# Patient Record
Sex: FEMALE | Race: WHITE | Employment: OTHER | URBAN - NONMETROPOLITAN AREA
[De-identification: names, ages, dates, MRNs, and addresses within clinical notes are randomized per-mention and may not be internally consistent; named-entity substitution may affect disease eponyms.]

---

## 2020-09-28 RX ORDER — HYDROCHLOROTHIAZIDE 25 MG/1
25 TABLET ORAL DAILY
COMMUNITY
End: 2021-04-21

## 2020-09-28 RX ORDER — PRAVASTATIN SODIUM 20 MG
20 TABLET ORAL DAILY
COMMUNITY
End: 2021-04-21 | Stop reason: SINTOL

## 2020-09-28 RX ORDER — LOSARTAN POTASSIUM 50 MG/1
50 TABLET ORAL DAILY
COMMUNITY
End: 2021-04-21

## 2020-09-28 SDOH — HEALTH STABILITY: MENTAL HEALTH: HOW OFTEN DO YOU HAVE A DRINK CONTAINING ALCOHOL?: NEVER

## 2020-10-05 ENCOUNTER — OFFICE VISIT (OUTPATIENT)
Dept: VASCULAR SURGERY | Age: 75
End: 2020-10-05
Payer: MEDICARE

## 2020-10-05 VITALS
DIASTOLIC BLOOD PRESSURE: 120 MMHG | RESPIRATION RATE: 18 BRPM | OXYGEN SATURATION: 98 % | SYSTOLIC BLOOD PRESSURE: 220 MMHG | HEART RATE: 85 BPM

## 2020-10-05 PROCEDURE — 1090F PRES/ABSN URINE INCON ASSESS: CPT | Performed by: PHYSICIAN ASSISTANT

## 2020-10-05 PROCEDURE — G8484 FLU IMMUNIZE NO ADMIN: HCPCS | Performed by: PHYSICIAN ASSISTANT

## 2020-10-05 PROCEDURE — G8400 PT W/DXA NO RESULTS DOC: HCPCS | Performed by: PHYSICIAN ASSISTANT

## 2020-10-05 PROCEDURE — G8427 DOCREV CUR MEDS BY ELIG CLIN: HCPCS | Performed by: PHYSICIAN ASSISTANT

## 2020-10-05 PROCEDURE — 99204 OFFICE O/P NEW MOD 45 MIN: CPT | Performed by: PHYSICIAN ASSISTANT

## 2020-10-05 PROCEDURE — 1123F ACP DISCUSS/DSCN MKR DOCD: CPT | Performed by: PHYSICIAN ASSISTANT

## 2020-10-05 PROCEDURE — 1036F TOBACCO NON-USER: CPT | Performed by: PHYSICIAN ASSISTANT

## 2020-10-05 PROCEDURE — 3017F COLORECTAL CA SCREEN DOC REV: CPT | Performed by: PHYSICIAN ASSISTANT

## 2020-10-05 PROCEDURE — 4040F PNEUMOC VAC/ADMIN/RCVD: CPT | Performed by: PHYSICIAN ASSISTANT

## 2020-10-05 PROCEDURE — G8421 BMI NOT CALCULATED: HCPCS | Performed by: PHYSICIAN ASSISTANT

## 2020-10-05 NOTE — PROGRESS NOTES
Patient Care Team:  Edwinna Epley, MD as PCP - General  Eric Chilel DO as Consulting Physician (Vascular Surgery)      History and Physical:    Mrs. Kwadwo Goldsmith is a 69 yo female who has a history of leg swelling, varicose and spider veins bilaterally for many years. She denies any history of SVT/DVT. She denies any claudication. She reports that the swelling seems to be a little worse in the right leg vs the left. She reports that she has  Had problems with leg swelling, heaviness,aching in her legs and multiple leg cramps mostly at  Night. She reports that this has gotten worse over the last month. She works 6 days a week and is on her feet most of the time. She reports that for many years she has been wearing compression stockings 20-30 mmHG and elevating her legs. She also c/o right foot pain but thinks that she broke her foot many years ago. Differential diagnosis for leg pain includes but is not limited to: varicose veins, peripheral vascular disease, arthritic pain, DVT, lumbar disc disease, and neurogenic pain. Kay Cao is a 70 y.o. female with the following history reviewed and recorded in [facility]: There are no active problems to display for this patient. Current Outpatient Medications   Medication Sig Dispense Refill    hydroCHLOROthiazide (HYDRODIURIL) 25 MG tablet Take 25 mg by mouth daily      losartan (COZAAR) 50 MG tablet Take 50 mg by mouth daily      pravastatin (PRAVACHOL) 20 MG tablet Take 20 mg by mouth daily       No current facility-administered medications for this visit. Allergies: Other and Penicillins  Past Medical History:   Diagnosis Date    Essential (primary) hypertension     Hyperlipemia     Varicose veins of legs      History reviewed. No pertinent surgical history.   Family History   Problem Relation Age of Onset    Heart Attack Mother     Heart Attack Father      Social History     Tobacco Use    Smoking status: Never Smoker    Smokeless tobacco: Never Used   Substance Use Topics    Alcohol use: Never     Frequency: Never       Old records obtained from the referring provider. These records have been reviewed and summarized. Review of Systems    Constitutional - no significant activity change, appetite change, or unexpected weight change. No fever or chills. No diaphoresis or significant fatigue. HENT - no significant rhinorrhea or epistaxis. No tinnitus or significant hearing loss. Eyes - no sudden vision change or amaurosis. Respiratory - no significant shortness of breath, wheezing, or stridor. No apnea, cough, or chest tightness associated with shortness of breath. Cardiovascular - no chest pain, syncope, or significant dizziness. No palpitations. She reports leg swelling. No claudication. Gastrointestinal - no abdominal swelling or pain. No blood in stool. No severe constipation, diarrhea, nausea, or vomiting. Genitourinary - No difficulty urinating, dysuria, frequency, or urgency. No flank pain or hematuria. Musculoskeletal - no back pain, gait disturbance, or myalgia. Skin - no color change, rash, pallor, or new wound. Neurologic - no dizziness, facial asymmetry, or light headedness. No seizures. No speech difficulty or lateralizing weakness. Hematologic - no easy bruising or excessive bleeding. Psychiatric - no severe anxiety or nervousness. No confusion. All other review of systems are negative. Physical Exam    BP (!) 220/120 (Site: Left Upper Arm, Position: Sitting, Cuff Size: Medium Adult)   Pulse 85   Resp 18   SpO2 98%     Constitutional - well developed, well nourished. No diaphoresis or acute distress. HENT - head normocephalic. Right external ear canal appears normal.  Left external ear canal appears normal.  Septum appears midline. Eyes - conjunctiva normal.  EOMS normal.  No exudate. No icterus. Neck- ROM appears normal, no tracheal deviation. Cardiovascular - Regular rate and rhythm. Heart sounds are normal.  No murmur, rub, or gallop. Carotid pulses are 2+ to palpation bilaterally without bruit. Extremities - Radial and ulnar pulses are 2+ to palpation bilaterally. DP and PT pulses are palpable. No cyanosis or clubbing. No signs atheroembolic event. She has multiple, large, prominent varicosities/spider veins bilaterally. Right leg appears equal  than the left. Feet are warm and well perfused. No wounds noted. Pulmonary - effort appears normal.  No respiratory distress. Lungs - Breath sounds normal. No wheezes or rales. GI - Abdomen - soft, non tender, bowel sounds X 4 quadrants. No guarding or rebound tenderness. No distension or palpable mass. Genitourinary - deferred. Musculoskeletal - ROM appears normal.  Neurologic - alert and oriented X 3. Physiologic. Skin - warm, dry, and intact. No rash, erythema, or pallor. Psychiatric - mood, affect, and behavior appear normal.  Judgment and thought processes appear normal.      Assessment    1. Leg swelling    2. Right leg pain    3. Left leg pain          Plan      Recommend Nsaids for pain control  Support hose  20-30 mmHg compression daily  Recommend leg elevation above the level of the heart  Good moisturization  Good skin care  Diet and exercise daily  Vein packet given to the patient  We will obtain a BLE venous scan for insufficiency and we will notify her with the results and further recommendations. Addendum:  BLE venous scan for reflux - (revewed by Dr. Alfreda Bazan)  Impression          Right Side: The greater saphenous vein exhibits no reflux. There is no     evidence of deep venous thrombosis in the segments insonated. There is no     deep vein reflux.    Bethany Sarah is no short saphenous vein reflux.          Left Side: The greater saphenous vein exhibits reflux lasting for a     maximum of 6609 milliseconds in the saphenofemoral junction. There is no     evidence of deep venous thrombosis in the segments insonated. Junction                   !          !5               !    +----------------------------------------------+----------+----------------+    ! GSV Mid Thigh                                 !          !3. 7             !    +----------------------------------------------+----------+----------------+    ! GSV Knee                                      !          !4. 1             !    +----------------------------------------------+----------+----------------+    ! SSV High Calf                                 !          !2               !    +----------------------------------------------+----------+----------------+    ! SSV Mid Calf                                  !          !2. 5             !    +----------------------------------------------+----------+----------------+    ! SSV Ankle                                     !          !2. 4             !    +----------------------------------------------+----------+----------------+         Left Mapping    +----------------------------------------------+----------+----------------+    ! Location                                      !AP Diam   ! Trans Diam      !    +----------------------------------------------+----------+----------------+    ! Sapheno Femoral Junction                      !6.3       !                !    +----------------------------------------------+----------+----------------+    ! GSV 2 cm Distal to Junction                   !          !5. 2             !    +----------------------------------------------+----------+----------------+    ! GSV Mid Thigh                                 !          !3. 7             !    +----------------------------------------------+----------+----------------+    ! GSV Knee                                      !          !4. 7             !    +----------------------------------------------+----------+----------------+    ! SSV High Calf                                 !          !6. 8             ! +----------------------------------------------+----------+----------------+    ! SSV Mid Calf                                  !          !2.2             !    +----------------------------------------------+----------+----------------+    ! SSV Ankle                                     !          !2. 1             !    +----------------------------------------------+----------+----------------+         Velocities are measured in cm/s ; Diameters are measured in mm         Right Lower Extremities DVT Study Measurements    Right 2D Measurements    +------------------------------------+----------+---------------+----------+    ! Location                            ! Visualized! Compressibility! Thrombosis! +------------------------------------+----------+---------------+----------+    ! Sapheno Femoral Junction            ! Yes       ! Yes            !None      !    +------------------------------------+----------+---------------+----------+    ! Common Femoral                      ! Yes       ! Yes            !None      !    +------------------------------------+----------+---------------+----------+    ! Prox Femoral                        ! Yes       ! Yes            !None      !    +------------------------------------+----------+---------------+----------+    ! Mid Femoral                         ! Yes       ! Yes            !None      !    +------------------------------------+----------+---------------+----------+    ! Dist Femoral                        ! Yes       ! Yes            !None      !    +------------------------------------+----------+---------------+----------+    ! Deep Femoral                        ! Yes       ! Yes            !None      !    +------------------------------------+----------+---------------+----------+    ! Popliteal                           ! Yes       ! Yes            !None      !    +------------------------------------+----------+---------------+----------+    ! DORISV                                 !Yes       ! Yes            !None      !    +------------------------------------+----------+---------------+----------+    ! Gastroc                             ! Yes       ! Yes            !None      !    +------------------------------------+----------+---------------+----------+    ! PTV                                 ! Yes       ! Yes            !None      !    +------------------------------------+----------+---------------+----------+    ! GSV                                 ! Yes       ! Yes            !None      !    +------------------------------------+----------+---------------+----------+    ! ATV                                 ! Yes       ! Yes            !None      !    +------------------------------------+----------+---------------+----------+    ! Peroneal                            ! Yes       ! Yes            !None      !    +------------------------------------+----------+---------------+----------+         Left Lower Extremities DVT Study Measurements    Left 2D Measurements    +------------------------------------+----------+---------------+----------+    ! Location                            ! Visualized! Compressibility! Thrombosis! +------------------------------------+----------+---------------+----------+    ! Sapheno Femoral Junction            ! Yes       ! Yes            !None      !    +------------------------------------+----------+---------------+----------+    ! Common Femoral                      ! Yes       ! Yes            !None      !    +------------------------------------+----------+---------------+----------+    ! Prox Femoral                        ! Yes       ! Yes            !None      !    +------------------------------------+----------+---------------+----------+    ! Mid Femoral                         ! Yes       ! Yes            !None      !    +------------------------------------+----------+---------------+----------+    ! Dist Femoral                        ! Yes       ! Yes            !None      !    +------------------------------------+----------+---------------+----------+    ! Deep Femoral                        ! Yes       ! Yes            !None      !    +------------------------------------+----------+---------------+----------+    ! Popliteal                           ! Yes       ! Yes            !None      !    +------------------------------------+----------+---------------+----------+    ! SSV                                 ! Yes       ! Yes            !None      !    +------------------------------------+----------+---------------+----------+    ! Gastroc                             ! Yes       ! Yes            !None      !    +------------------------------------+----------+---------------+----------+    ! PTV                                 ! Yes       ! Yes            !None      !    +------------------------------------+----------+---------------+----------+    ! GSV                                 ! Yes       ! Yes            !None      !    +------------------------------------+----------+---------------+----------+    ! ATV                                 ! Yes       ! Yes            !None      !    +------------------------------------+----------+---------------+----------+    ! Peroneal                            ! Yes       ! Yes            !None      !    +------------------------------------+----------+---------------+----------+             Dr. Yvette Potts has reviewed the reflux study. If she fails compression therapy and elevation, she would be a candidate for a RFA left GSV only.

## 2020-10-22 ENCOUNTER — HOSPITAL ENCOUNTER (OUTPATIENT)
Dept: VASCULAR LAB | Age: 75
Discharge: HOME | End: 2020-10-22
Payer: MEDICARE

## 2020-10-22 PROCEDURE — 93970 EXTREMITY STUDY: CPT

## 2020-11-06 ENCOUNTER — TELEPHONE (OUTPATIENT)
Dept: VASCULAR SURGERY | Age: 75
End: 2020-11-06

## 2020-11-06 NOTE — TELEPHONE ENCOUNTER
----- Message from Sofya Souza PA-C sent at 11/6/2020 12:45 PM CST -----  See plan in OV note. Can you call her with his recommendations. She also needs a 3 month f/u appt.

## 2020-11-06 NOTE — TELEPHONE ENCOUNTER
I sw the pt to let her know after reviewing her case Dr. Blayne Covarrubias states she will be a candidate for only a L GSV RFA if she fails compression and elevation therapy. I informed the pt I will schedule her for a 3 month telehealth f/u. Pt was agreeable to this plan. Pt voiced understanding and is aware of the POC.

## 2021-02-18 ENCOUNTER — VIRTUAL VISIT (OUTPATIENT)
Dept: VASCULAR SURGERY | Age: 76
End: 2021-02-18
Payer: MEDICARE

## 2021-02-18 DIAGNOSIS — M79.89 LEG SWELLING: ICD-10-CM

## 2021-02-18 DIAGNOSIS — I87.2 VENOUS INSUFFICIENCY: Primary | ICD-10-CM

## 2021-02-18 PROCEDURE — 3017F COLORECTAL CA SCREEN DOC REV: CPT | Performed by: PHYSICIAN ASSISTANT

## 2021-02-18 PROCEDURE — G8421 BMI NOT CALCULATED: HCPCS | Performed by: PHYSICIAN ASSISTANT

## 2021-02-18 PROCEDURE — 1036F TOBACCO NON-USER: CPT | Performed by: PHYSICIAN ASSISTANT

## 2021-02-18 PROCEDURE — 99213 OFFICE O/P EST LOW 20 MIN: CPT | Performed by: PHYSICIAN ASSISTANT

## 2021-02-18 PROCEDURE — G8484 FLU IMMUNIZE NO ADMIN: HCPCS | Performed by: PHYSICIAN ASSISTANT

## 2021-02-18 PROCEDURE — G8400 PT W/DXA NO RESULTS DOC: HCPCS | Performed by: PHYSICIAN ASSISTANT

## 2021-02-18 PROCEDURE — 1090F PRES/ABSN URINE INCON ASSESS: CPT | Performed by: PHYSICIAN ASSISTANT

## 2021-02-18 PROCEDURE — 4040F PNEUMOC VAC/ADMIN/RCVD: CPT | Performed by: PHYSICIAN ASSISTANT

## 2021-02-18 PROCEDURE — G8428 CUR MEDS NOT DOCUMENT: HCPCS | Performed by: PHYSICIAN ASSISTANT

## 2021-02-18 PROCEDURE — 1123F ACP DISCUSS/DSCN MKR DOCD: CPT | Performed by: PHYSICIAN ASSISTANT

## 2021-02-18 NOTE — PROGRESS NOTES
Patient Care Team:  Callie Bhatti MD as PCP - General  Rosemary Eid DO as Consulting Physician (Vascular Surgery)      History and Physical:    Mrs. Laura Godinez is a 71 yo female who has a history of leg swelling, varicose and spider veins bilaterally for many years. She denies any history of SVT/DVT. She denies any claudication. She reports that the swelling seems to be a little worse in the right leg vs the left. She reports that she has had problems with leg swelling, heaviness,aching in her legs and multiple leg cramps mostly at night. She reports that this has gotten worse over the last month. She works 6 days a week and is on her feet most of the time. She reports that for many years she has been wearing compression stockings 20-30 mmHG and elevating her legs. She also c/o right foot pain but thinks that she broke her foot many years ago. Today she reports that her legs are still swelling and are painful. This is affecting her daily living. She reports that she sleeps in a lift chair due to her inability to get comfortable in the bed. Isreal Cruz is a 70 y.o. female with the following history reviewed and recorded in [employer]: There are no active problems to display for this patient. Current Outpatient Medications   Medication Sig Dispense Refill    hydroCHLOROthiazide (HYDRODIURIL) 25 MG tablet Take 25 mg by mouth daily      losartan (COZAAR) 50 MG tablet Take 50 mg by mouth daily      pravastatin (PRAVACHOL) 20 MG tablet Take 20 mg by mouth daily       No current facility-administered medications for this visit. Allergies: Other and Penicillins  Past Medical History:   Diagnosis Date    Essential (primary) hypertension     Hyperlipemia     Varicose veins of legs      No past surgical history on file.   Family History   Problem Relation Age of Onset    Heart Attack Mother     Heart Attack Father      Social History     Tobacco Use    Smoking status: Never Smoker  Smokeless tobacco: Never Used   Substance Use Topics    Alcohol use: Never     Frequency: Never       Review of Systems    Constitutional  no significant activity change, appetite change, or unexpected weight change. No fever or chills. No diaphoresis or significant fatigue. HENT  no significant rhinorrhea or epistaxis. No tinnitus or significant hearing loss. Eyes  no sudden vision change or amaurosis. Respiratory  no significant shortness of breath, wheezing, or stridor. No apnea, cough, or chest tightness associated with shortness of breath. Cardiovascular  no chest pain, syncope, or significant dizziness. No palpitations. She reports leg swelling. No claudication. (see HPI)  Gastrointestinal  no abdominal swelling or pain. No blood in stool. No severe constipation, diarrhea, nausea, or vomiting. Genitourinary  No difficulty urinating, dysuria, frequency, or urgency. No flank pain or hematuria. Musculoskeletal  no back pain, gait disturbance, or myalgia. Skin  no color change, rash, pallor, or new wound. Neurologic  no dizziness, facial asymmetry, or light headedness. No seizures. No speech difficulty or lateralizing weakness. Hematologic  no easy bruising or excessive bleeding. Psychiatric  no severe anxiety or nervousness. No confusion. All other review of systems are negative. Physical Exam      Constitutional  well developed, well nourished. No diaphoresis or acute distress. HENT  head normocephalic. Right external ear canal appears normal.  Left external ear canal appears normal.  Septum appears midline. Eyes  conjunctiva normal.  EOMS normal.  No exudate. No icterus. Neck- ROM appears normal, no tracheal deviation. Cardiovascular  Regular rate and rhythm. Heart sounds are normal.  No murmur, rub, or gallop. Carotid pulses are 2+ to palpation bilaterally without bruit. Extremities - Radial and ulnar pulses are 2+ to palpation bilaterally. DP and PT pulses are palpable. No cyanosis or clubbing. No signs atheroembolic event. She has multiple, large, prominent varicosities/spider veins bilaterally. Right leg appears equal  than the left. Feet are warm and well perfused. No wounds noted. Pulmonary  effort appears normal.  No respiratory distress. Lungs - Breath sounds normal. No wheezes or rales. GI - Abdomen  soft, non tender, bowel sounds X 4 quadrants. No guarding or rebound tenderness. No distension or palpable mass. Genitourinary  deferred. Musculoskeletal  ROM appears normal.  Neurologic  alert and oriented X 3. Physiologic. Skin  warm, dry, and intact. No rash, erythema, or pallor. Psychiatric  mood, affect, and behavior appear normal.  Judgment and thought processes appear normal.      Assessment    1. CVI eft leg  2. BLE swelling  3. BLE with varicose veins with leg pain      Plan      Recommend Nsaids for pain control  Support hose  20-30 mmHg compression daily  Recommend leg elevation above the level of the heart  Good moisturization  Good skin care  Diet and exercise daily    Addendum:  BLE venous scan for reflux - (revewed by Dr. León Perez)  Impression          Right Side: The greater saphenous vein exhibits no reflux. There is no     evidence of deep venous thrombosis in the segments insonated. There is no     deep vein reflux.    Isaias Godinez is no short saphenous vein reflux.          Left Side: The greater saphenous vein exhibits reflux lasting for a     maximum of 6609 milliseconds in the saphenofemoral junction. There is no     evidence of deep venous thrombosis in the segments insonated.  There is no     deep vein reflux.    Isaias Godinez is 6609milli seconds of short saphenous vein reflux.          Signature          ----------------------------------------------------------------     Electronically signed by Marcia Quiroga MD(Interpreting  physician) on 10/23/2020 07:00 AM     ----------------------------------------------------------------         Velocities are measured in cm/s ; Diameters are measured in mm         Left Doppler Measurements    +----------------------------------------+------+------+-------------------+    ! Location                                !Signal!Reflux! Reflux (msec)      !    +----------------------------------------+------+------+-------------------+    ! Sapheno Femoral Junction                !      !      !9312               !    +----------------------------------------+------+------+-------------------+    ! GSV 2 cm Distal to Junction             !      !      !7433               !    +----------------------------------------+------+------+-------------------+    ! GSV Mid Thigh                           !      !      !1098               !    +----------------------------------------+------+------+-------------------+    ! GSV Knee                                !      !      !8011               !    +----------------------------------------+------+------+-------------------+    ! SSV High Calf                           !      !      !1263               !    +----------------------------------------+------+------+-------------------+         Right Mapping    +----------------------------------------------+----------+----------------+    ! Location                                      !AP Diam   ! Trans Diam      !    +----------------------------------------------+----------+----------------+    ! Sapheno Femoral Junction                      !7.7       !                !    +----------------------------------------------+----------+----------------+    ! GSV 2 cm Distal to Junction                   !          !5               !    +----------------------------------------------+----------+----------------+    ! GSV Mid Thigh                                 !          !3. 7             ! +----------------------------------------------+----------+----------------+    ! GSV Knee                                      !          !4. 1             !    +----------------------------------------------+----------+----------------+    ! SSV High Calf                                 !          !2               !    +----------------------------------------------+----------+----------------+    ! SSV Mid Calf                                  !          !2. 5             !    +----------------------------------------------+----------+----------------+    ! SSV Ankle                                     !          !2. 4             !    +----------------------------------------------+----------+----------------+         Left Mapping    +----------------------------------------------+----------+----------------+    ! Location                                      !AP Diam   ! Trans Diam      !    +----------------------------------------------+----------+----------------+    ! Sapheno Femoral Junction                      !6.3       !                !    +----------------------------------------------+----------+----------------+    ! GSV 2 cm Distal to Junction                   !          !5. 2             !    +----------------------------------------------+----------+----------------+    ! GSV Mid Thigh                                 !          !3. 7             !    +----------------------------------------------+----------+----------------+    ! GSV Knee                                      !          !4. 7             !    +----------------------------------------------+----------+----------------+    ! SSV High Calf                                 !          !6. 8             !    +----------------------------------------------+----------+----------------+    ! SSV Mid Calf                                  !          !2.2             !    +----------------------------------------------+----------+----------------+ ! SSV Ankle                                     !          !2. 1             !    +----------------------------------------------+----------+----------------+         Velocities are measured in cm/s ; Diameters are measured in mm         Right Lower Extremities DVT Study Measurements    Right 2D Measurements    +------------------------------------+----------+---------------+----------+    ! Location                            ! Visualized! Compressibility! Thrombosis! +------------------------------------+----------+---------------+----------+    ! Sapheno Femoral Junction            ! Yes       ! Yes            !None      !    +------------------------------------+----------+---------------+----------+    ! Common Femoral                      ! Yes       ! Yes            !None      !    +------------------------------------+----------+---------------+----------+    ! Prox Femoral                        ! Yes       ! Yes            !None      !    +------------------------------------+----------+---------------+----------+    ! Mid Femoral                         ! Yes       ! Yes            !None      !    +------------------------------------+----------+---------------+----------+    ! Dist Femoral                        ! Yes       ! Yes            !None      !    +------------------------------------+----------+---------------+----------+    ! Deep Femoral                        ! Yes       ! Yes            !None      !    +------------------------------------+----------+---------------+----------+    ! Popliteal                           ! Yes       ! Yes            !None      !    +------------------------------------+----------+---------------+----------+    ! SSV                                 ! Yes       ! Yes            !None      !    +------------------------------------+----------+---------------+----------+    ! Gastroc                             ! Yes       ! Yes            !None      ! +------------------------------------+----------+---------------+----------+    ! Popliteal                           ! Yes       ! Yes            !None      !    +------------------------------------+----------+---------------+----------+    ! SSV                                 ! Yes       ! Yes            !None      !    +------------------------------------+----------+---------------+----------+    ! Gastroc                             ! Yes       ! Yes            !None      !    +------------------------------------+----------+---------------+----------+    ! PTV                                 ! Yes       ! Yes            !None      !    +------------------------------------+----------+---------------+----------+    ! GSV                                 ! Yes       ! Yes            !None      !    +------------------------------------+----------+---------------+----------+    ! ATV                                 ! Yes       ! Yes            !None      !    +------------------------------------+----------+---------------+----------+    ! Peroneal                            ! Yes       ! Yes            !None      !    +------------------------------------+----------+---------------+----------+             Dr. Ricki Love has reviewed the reflux study. If she fails compression therapy and elevation, she would be a candidate for a RFA left GSV only. Proceed with RFA left GSV. I will d/w Dr. Ricki Love need for possible Venogram in the future. We will call her with further recommendations.      Addendum: Dr. Toy Claude recommends we proceed with RFA left GSV first and at a later date she will need a Right iliofemoral venogram with IVUS possible iliac stent. Options were discussed with the patient including continued medical management versus proceeding with RFA left GSV followed by a Venogram and potential intervention for Right iliofemoral venogram with IVUS possible iliac stent. .  Patient has opted to proceed with this Risks have been discussed with the patient including but not limited to MI, death, CVA, bleed, nerve injury, infection, contrast nephropathy, possible need for dialysis, and need for further surgery.       Proceed with RFA left GSV

## 2021-04-17 ENCOUNTER — OFFICE VISIT (OUTPATIENT)
Age: 76
End: 2021-04-17

## 2021-04-17 DIAGNOSIS — Z11.59 SCREENING FOR VIRAL DISEASE: Primary | ICD-10-CM

## 2021-04-17 LAB — SARS-COV-2, PCR: NORMAL

## 2021-04-17 PROCEDURE — 99999 PR OFFICE/OUTPT VISIT,PROCEDURE ONLY: CPT | Performed by: NURSE PRACTITIONER

## 2021-04-19 DIAGNOSIS — M79.605 LEFT LEG PAIN: Primary | ICD-10-CM

## 2021-04-19 DIAGNOSIS — I87.2 VENOUS INSUFFICIENCY: Primary | ICD-10-CM

## 2021-04-19 DIAGNOSIS — M79.89 LEFT LEG SWELLING: ICD-10-CM

## 2021-04-19 DIAGNOSIS — I87.2 VENOUS INSUFFICIENCY: ICD-10-CM

## 2021-04-20 RX ORDER — DIAZEPAM 10 MG/1
TABLET ORAL
Qty: 1 TABLET | Refills: 0 | Status: SHIPPED | OUTPATIENT
Start: 2021-04-20 | End: 2021-04-22

## 2021-04-21 ENCOUNTER — PROCEDURE VISIT (OUTPATIENT)
Dept: VASCULAR SURGERY | Age: 76
End: 2021-04-21

## 2021-04-21 VITALS
SYSTOLIC BLOOD PRESSURE: 234 MMHG | RESPIRATION RATE: 18 BRPM | OXYGEN SATURATION: 99 % | HEIGHT: 64 IN | HEART RATE: 74 BPM | DIASTOLIC BLOOD PRESSURE: 102 MMHG | TEMPERATURE: 96.5 F | WEIGHT: 165 LBS | BODY MASS INDEX: 28.17 KG/M2

## 2021-04-21 DIAGNOSIS — I87.2 VENOUS INSUFFICIENCY: ICD-10-CM

## 2021-04-21 DIAGNOSIS — M79.605 LEFT LEG PAIN: Primary | ICD-10-CM

## 2021-04-21 DIAGNOSIS — M79.89 LEFT LEG SWELLING: ICD-10-CM

## 2021-04-21 RX ORDER — LISINOPRIL 10 MG/1
10 TABLET ORAL PRN
COMMUNITY

## 2021-04-21 NOTE — PROGRESS NOTES
performed. The sheath and catheter were removed and both were inspected and intact. Pressure was applied at exit site for 10 minutes. Sterile dressings and ace wrap were applied. The patient was released in stable condition and discharge instructions were given to the patient. They will follow-up in 72 hours for left lower extremity venous duplex and office visit.             [facility], DO

## 2021-04-23 ENCOUNTER — HOSPITAL ENCOUNTER (OUTPATIENT)
Dept: VASCULAR LAB | Age: 76
Discharge: HOME | End: 2021-04-23
Payer: MEDICARE

## 2021-04-23 DIAGNOSIS — M79.605 LEFT LEG PAIN: ICD-10-CM

## 2021-04-23 DIAGNOSIS — M79.89 LEFT LEG SWELLING: ICD-10-CM

## 2021-04-23 DIAGNOSIS — I87.2 VENOUS INSUFFICIENCY: ICD-10-CM

## 2021-04-23 PROCEDURE — 93971 EXTREMITY STUDY: CPT

## 2021-04-26 ENCOUNTER — TELEPHONE (OUTPATIENT)
Dept: VASCULAR SURGERY | Age: 76
End: 2021-04-26

## 2021-04-26 NOTE — TELEPHONE ENCOUNTER
----- Message from Antonio Becker PA-C sent at 4/24/2021  5:59 PM CDT -----  Please call and let her know that her ablation was successful and he has no DVT

## 2021-04-27 ENCOUNTER — VIRTUAL VISIT (OUTPATIENT)
Dept: VASCULAR SURGERY | Age: 76
End: 2021-04-27
Payer: MEDICARE

## 2021-04-27 ENCOUNTER — TELEPHONE (OUTPATIENT)
Dept: VASCULAR SURGERY | Age: 76
End: 2021-04-27

## 2021-04-27 DIAGNOSIS — M79.604 LEG PAIN, RIGHT: ICD-10-CM

## 2021-04-27 DIAGNOSIS — I87.2 VENOUS INSUFFICIENCY: Primary | ICD-10-CM

## 2021-04-27 DIAGNOSIS — M79.89 LEG SWELLING: ICD-10-CM

## 2021-04-27 PROCEDURE — 4040F PNEUMOC VAC/ADMIN/RCVD: CPT | Performed by: NURSE PRACTITIONER

## 2021-04-27 PROCEDURE — G8417 CALC BMI ABV UP PARAM F/U: HCPCS | Performed by: NURSE PRACTITIONER

## 2021-04-27 PROCEDURE — 3017F COLORECTAL CA SCREEN DOC REV: CPT | Performed by: NURSE PRACTITIONER

## 2021-04-27 PROCEDURE — 1123F ACP DISCUSS/DSCN MKR DOCD: CPT | Performed by: NURSE PRACTITIONER

## 2021-04-27 PROCEDURE — 1090F PRES/ABSN URINE INCON ASSESS: CPT | Performed by: NURSE PRACTITIONER

## 2021-04-27 PROCEDURE — 99204 OFFICE O/P NEW MOD 45 MIN: CPT | Performed by: NURSE PRACTITIONER

## 2021-04-27 PROCEDURE — G8427 DOCREV CUR MEDS BY ELIG CLIN: HCPCS | Performed by: NURSE PRACTITIONER

## 2021-04-27 PROCEDURE — G8400 PT W/DXA NO RESULTS DOC: HCPCS | Performed by: NURSE PRACTITIONER

## 2021-04-27 PROCEDURE — 1036F TOBACCO NON-USER: CPT | Performed by: NURSE PRACTITIONER

## 2021-04-27 RX ORDER — SODIUM CHLORIDE 9 MG/ML
25 INJECTION, SOLUTION INTRAVENOUS PRN
Status: CANCELLED | OUTPATIENT
Start: 2021-04-27

## 2021-04-27 RX ORDER — SODIUM CHLORIDE 9 MG/ML
INJECTION, SOLUTION INTRAVENOUS CONTINUOUS
Status: CANCELLED | OUTPATIENT
Start: 2021-04-27

## 2021-04-27 RX ORDER — ASPIRIN 81 MG/1
81 TABLET ORAL ONCE
Status: CANCELLED | OUTPATIENT
Start: 2021-04-27 | End: 2021-04-27

## 2021-04-27 RX ORDER — SODIUM CHLORIDE 0.9 % (FLUSH) 0.9 %
10 SYRINGE (ML) INJECTION PRN
Status: CANCELLED | OUTPATIENT
Start: 2021-04-27

## 2021-04-27 RX ORDER — CLONIDINE HYDROCHLORIDE 0.1 MG/1
0.1 TABLET ORAL PRN
Status: CANCELLED | OUTPATIENT
Start: 2021-04-27

## 2021-04-27 NOTE — H&P (VIEW-ONLY)
Lilly Osorio is a 70 y.o. female evaluated via telephone on 2021. Lilly Osorio (:  1945) is a 70 y.o. female,Established patient, here for evaluation of the following chief complaint(s):     Consent:  She and/or health care decision maker is aware that that she may receive a bill for this telephone service, depending on her insurance coverage, and has provided verbal consent to proceed: Yes    Patient is located at home  Provider is located at [facility]   Also present during call is no one    Patient had left GSV ablation last week. She is doing well. She is wearing her support hose. No fever or chills. She has continued right leg swelling. She reports it does not go down. She has deep pain in the right leg. Lilly Osorio is a 70 y.o. female with the following history reviewed and recorded in [facility]:  Patient Active Problem List    Diagnosis Date Noted    Venous insufficiency 2021     Current Outpatient Medications   Medication Sig Dispense Refill    lisinopril (PRINIVIL;ZESTRIL) 10 MG tablet Take 10 mg by mouth 2 times daily       No current facility-administered medications for this visit. Allergies: Other and Penicillins  Past Medical History:   Diagnosis Date    Essential (primary) hypertension     Hyperlipemia     Varicose veins of legs      Past Surgical History:   Procedure Laterality Date    VASCULAR SURGERY  2021    L GSV/RFA     Family History   Problem Relation Age of Onset    Heart Attack Mother     Heart Attack Father      Social History     Tobacco Use    Smoking status: Never Smoker    Smokeless tobacco: Never Used   Substance Use Topics    Alcohol use: Never     Frequency: Never       No flowsheet data found. Review of Systems      Eyes  no sudden vision change or amaurosis. Respiratory  no significant shortness of breath, wheezing, or stridor.   No cough,  Cardiovascular  no chest pain, syncope, or significant dizziness. No significant leg swelling.  has not had claudication. Skin   has not had new wound. Neurologic   No speech difficulty or lateralizing weakness. Psychiatric  no severe anxiety or nervousness. No confusion. All other review of systems are negative. PHYSICAL EXAMINATION:    [ INSTRUCTIONS:  \"[x]\" Indicates a positive item  \"[]\" Indicates a negative item  -- DELETE ALL ITEMS NOT EXAMINED]    [x] Alert  [x] Oriented to person/place/time    [x] No apparent distress  [] Toxic appearing  [x] Normal Mood  [] Anxious appearing    [] Depressed appearing  [] Confused appearing      [] Poor short term memory  [] Poor long term memory   Memory appears to be intact       Venous scan -   There is no evidence of deep venous thrombosis (DVT) in the left lower    extremity(ies).    Successful ablation of the greater saphenous vein in the left lower    extremity. Individual images were reviewed. Results were reviewed with the patient. Options were discussed with the patient including continued medical management versus proceeding with venography and potential intervention for possible right iliac vein stenosis. Patient has opted to proceed with venography. Risks have been discussed with the patient including but not limited to MI, death, CVA, bleed, nerve injury, infection, contrast nephropathy, possible need for dialysis, and need for further surgery. Assessment    1. Venous insufficiency    2. Leg pain, right    3. Leg swelling          Plan    1. Venous insufficiency  2. Leg pain, right  3. Leg swelling      Proceed with venogram right leg with possible right iliac vein angioplasty/stent with IVUS due to continued leg pain and swelling  Continue support hose  Strongly encouraged start/continue statin therapy  Recommended no smoking        Documentation:  I communicated with the patient and/or health care decision maker about cvi.    Details of this discussion including any medical advice provided: as needed       I affirm this is a Patient Initiated Episode with a Patient who has not had a related appointment within my department in the past 7 days or scheduled within the next 24 hours. Patient identification was verified at the start of the visit: Yes    Total Time:11-20 minutes  The visit was conducted pursuant to the emergency declaration under the [address] [place] [address], [address] [place] [address] and the [employer] and 800APP General Act. Patient identification was verified, and a caregiver was present when appropriate. The patient was located in a state where the provider was credentialed to provide care.     Note: not billable if this call serves to triage the patient into an appointment for the relevant concern      [facility]

## 2021-04-27 NOTE — H&P
Ada Patel is a 70 y.o. female evaluated via telephone on 2021. Ada Patel (:  1945) is a 70 y.o. female,Established patient, here for evaluation of the following chief complaint(s):     Consent:  She and/or health care decision maker is aware that that she may receive a bill for this telephone service, depending on her insurance coverage, and has provided verbal consent to proceed: Yes    Patient is located at home  Provider is located at [facility]   Also present during call is no one    Patient had left GSV ablation last week. She is doing well. She is wearing her support hose. No fever or chills. She has continued right leg swelling. She reports it does not go down. She has deep pain in the right leg. Ada Patel is a 70 y.o. female with the following history reviewed and recorded in [employer]:  Patient Active Problem List    Diagnosis Date Noted    Venous insufficiency 2021     Current Outpatient Medications   Medication Sig Dispense Refill    lisinopril (PRINIVIL;ZESTRIL) 10 MG tablet Take 10 mg by mouth 2 times daily       No current facility-administered medications for this visit. Allergies: Other and Penicillins  Past Medical History:   Diagnosis Date    Essential (primary) hypertension     Hyperlipemia     Varicose veins of legs      Past Surgical History:   Procedure Laterality Date    VASCULAR SURGERY  2021    L GSV/RFA     Family History   Problem Relation Age of Onset    Heart Attack Mother     Heart Attack Father      Social History     Tobacco Use    Smoking status: Never Smoker    Smokeless tobacco: Never Used   Substance Use Topics    Alcohol use: Never     Frequency: Never       No flowsheet data found. Review of Systems      Eyes  no sudden vision change or amaurosis. Respiratory  no significant shortness of breath, wheezing, or stridor.   No cough,  Cardiovascular  no chest pain, syncope, or significant

## 2021-04-27 NOTE — PROGRESS NOTES
Saint Sago is a 70 y.o. female evaluated via telephone on 2021. Saint Sago (:  1945) is a 70 y.o. female,Established patient, here for evaluation of the following chief complaint(s):     Consent:  She and/or health care decision maker is aware that that she may receive a bill for this telephone service, depending on her insurance coverage, and has provided verbal consent to proceed: Yes    Patient is located at home  Provider is located at [facility]   Also present during call is no one    Patient had left GSV ablation last week. She is doing well. She is wearing her support hose. No fever or chills. She has continued right leg swelling. She reports it does not go down. She has deep pain in the right leg. Saint Sago is a 70 y.o. female with the following history reviewed and recorded in [employer]:  Patient Active Problem List    Diagnosis Date Noted    Venous insufficiency 2021     Current Outpatient Medications   Medication Sig Dispense Refill    lisinopril (PRINIVIL;ZESTRIL) 10 MG tablet Take 10 mg by mouth 2 times daily       No current facility-administered medications for this visit. Allergies: Other and Penicillins  Past Medical History:   Diagnosis Date    Essential (primary) hypertension     Hyperlipemia     Varicose veins of legs      Past Surgical History:   Procedure Laterality Date    VASCULAR SURGERY  2021    L GSV/RFA     Family History   Problem Relation Age of Onset    Heart Attack Mother     Heart Attack Father      Social History     Tobacco Use    Smoking status: Never Smoker    Smokeless tobacco: Never Used   Substance Use Topics    Alcohol use: Never     Frequency: Never       No flowsheet data found. Review of Systems      Eyes  no sudden vision change or amaurosis. Respiratory  no significant shortness of breath, wheezing, or stridor.   No cough,  Cardiovascular  no chest pain, syncope, or significant dizziness. No significant leg swelling.  has not had claudication. Skin   has not had new wound. Neurologic   No speech difficulty or lateralizing weakness. Psychiatric  no severe anxiety or nervousness. No confusion. All other review of systems are negative. PHYSICAL EXAMINATION:    [ INSTRUCTIONS:  \"[x]\" Indicates a positive item  \"[]\" Indicates a negative item  -- DELETE ALL ITEMS NOT EXAMINED]    [x] Alert  [x] Oriented to person/place/time    [x] No apparent distress  [] Toxic appearing  [x] Normal Mood  [] Anxious appearing    [] Depressed appearing  [] Confused appearing      [] Poor short term memory  [] Poor long term memory   Memory appears to be intact       Venous scan -   There is no evidence of deep venous thrombosis (DVT) in the left lower    extremity(ies).    Successful ablation of the greater saphenous vein in the left lower    extremity. Individual images were reviewed. Results were reviewed with the patient. Options were discussed with the patient including continued medical management versus proceeding with venography and potential intervention for possible right iliac vein stenosis. Patient has opted to proceed with venography. Risks have been discussed with the patient including but not limited to MI, death, CVA, bleed, nerve injury, infection, contrast nephropathy, possible need for dialysis, and need for further surgery. Assessment    1. Venous insufficiency    2. Leg pain, right    3. Leg swelling          Plan    1. Venous insufficiency  2. Leg pain, right  3. Leg swelling      Proceed with venogram right leg with possible right iliac vein angioplasty/stent with IVUS due to continued leg pain and swelling  Continue support hose  Strongly encouraged start/continue statin therapy  Recommended no smoking        Documentation:  I communicated with the patient and/or health care decision maker about cvi.    Details of this discussion including any medical advice provided: as needed       I affirm this is a Patient Initiated Episode with a Patient who has not had a related appointment within my department in the past 7 days or scheduled within the next 24 hours. Patient identification was verified at the start of the visit: Yes    Total Time:11-20 minutes  The visit was conducted pursuant to the emergency declaration under the [address] [place] [address], [address] [place] [address] and the [employer] and [employer] Act. Patient identification was verified, and a caregiver was present when appropriate. The patient was located in a state where the provider was credentialed to provide care.     Note: not billable if this call serves to triage the patient into an appointment for the relevant concern      Todd Galeano

## 2021-04-27 NOTE — TELEPHONE ENCOUNTER
I tried contacting the pt to discuss the details of her upcoming procedure with Dr. Shell Shahid. Unfortunately I was unable to reach her or leave a vm. I will try again at a later time.

## 2021-04-28 NOTE — TELEPHONE ENCOUNTER
I sw the pt to discuss the details of her upcoming procedure with Dr. Deann Sanon on 2021. I will also mail a letter out today with this information. Directions discussed as follows:          Gordon Ramirez    Venogram Instructions    1. Report to the [facility] at [facility] (go in the front door and to the left) on 2021, at 7:00 am.  2. Nothing to eat or drink after midnight the night before the procedure. 3. Please take all medications as normally scheduled to take, including heart and blood pressure medicines with a sip of water. Except Lisinopril, do not take this medication the morning of surgery. 4. Do not take Lasix, insulin, or any diabetic medicine the morning of the procedure. If you take insulin, you may only take 1/2 of any scheduled nightly dose, and none the morning of the procedure. You may take all regularly scheduled heart, cholesterol, and blood pressure medicines with a sip of water. 5. If you take Glucophage, Metformin, Actos Plus Met, or Glucovance,you can not take this the day of your procedure and two days after the procedure. 6. Hold Plavix/Coumadin for 0 days prior to surgery. 7. Do not hold aspirin. 8. If you have sleep apnea and require C-PAP, please bring this with you to the hospital.  9. Bring a list of all of your allergies and medications with you to the hospital.  10. Please let our nurse know if you have had an allergy to iodine, shellfish, or x-ray dye. 6. Let the nurse know if you take any of the followin. Over the counter herbal supplements  2. Diclofenec, indomethacin, ketoprofen, Caridopa/levadopa, naproxen, sulindac, piroxicam, glucosamine, Chondrotin, cocchine, or methotrexate. 12. Plan to stay at the hospital for 4 - 6 hours before being released  by the physician. You will need someone to drive you home after the procedure. 13. Medications instructed to hold: See Above  14.  Please stop at your local [facility] or pharmacy and buy a bottle of Hibiclens. Wash thoroughly with this the night before and the morning of the procedure, paying special attention to the area that will be operated on. Make sure you rinse very well. The Hibiclens should only be used prior to surgery. 15. New guidelines require a COVID 19 test to be done prior to procedure and we suggest self quarantine after the test until procedure. You will need to go to the [facility] on Mon. 5/3/2021 for the COVID 19 test. They open at 8 am, you will need to have your swab prior to 11 am. You will go to the front of the building and drive under the awning and someone will come to your car. Do not get out of the car. Let them know that you are scheduled for a procedure with Dr. Aaron Rangel on 5/7/2021. After your COVID 19 test we suggest that you self quarantine until your procedure. 12. New policy requires that anyone who comes into the hospital will be required to wear a face mask. A cloth mask is acceptable. 17. To ensure the health and safety of our patients and staff, [address] [place] [address],[address] has implemented visitor restrictions. Only one person will be allowed to accompany you for your procedure. If you or your visitor are exhibiting signs & symptoms of illness such as fever, cough, sore throat or body aches, we ask that you reschedule your procedure to a later date after your symptoms have been resolved. 18. Other Directions: If you have any questions or concerns please contact our office at [phone] and ask for Sienna Love. PLEASE NOTE:  If the patient is unable to sign his/her own paperwork, the appointed caregiver (POA, child, sibling, etc) must be present at the time of registration for all testing and procedures. Transportation to and from all testing and procedure appointments is the sole responsibility of the patient, caregiver, and/or nursing facility in which they reside.  Please remember you will not be able to drive after you are discharged. Please call the office at [phone] with any questions or concerns. Please allow 48-72 hours notice for cancellations or rescheduling. We will attempt to accommodate your needs to the best of our capabilities, however, strict policies with procedure room availability does not allow much flexibility.

## 2021-05-03 ENCOUNTER — OFFICE VISIT (OUTPATIENT)
Age: 76
End: 2021-05-03

## 2021-05-03 VITALS — OXYGEN SATURATION: 97 % | HEART RATE: 72 BPM

## 2021-05-03 DIAGNOSIS — Z11.59 SCREENING FOR VIRAL DISEASE: Primary | ICD-10-CM

## 2021-05-03 LAB — SARS-COV-2, PCR: NOT DETECTED

## 2021-05-03 PROCEDURE — 99999 PR OFFICE/OUTPT VISIT,PROCEDURE ONLY: CPT | Performed by: NURSE PRACTITIONER

## 2021-05-07 ENCOUNTER — HOSPITAL ENCOUNTER (OUTPATIENT)
Dept: INTERVENTIONAL RADIOLOGY/VASCULAR | Age: 76
Discharge: HOME | End: 2021-05-07
Payer: MEDICARE

## 2021-05-07 VITALS
OXYGEN SATURATION: 100 % | BODY MASS INDEX: 28.35 KG/M2 | HEART RATE: 63 BPM | DIASTOLIC BLOOD PRESSURE: 86 MMHG | WEIGHT: 160 LBS | RESPIRATION RATE: 18 BRPM | TEMPERATURE: 97 F | HEIGHT: 63 IN | SYSTOLIC BLOOD PRESSURE: 193 MMHG

## 2021-05-07 DIAGNOSIS — I87.2 VENOUS INSUFFICIENCY OF RIGHT LOWER EXTREMITY: ICD-10-CM

## 2021-05-07 LAB
ANION GAP SERPL CALCULATED.3IONS-SCNC: 8 MMOL/L (ref 7–19)
BUN BLDV-MCNC: 23 MG/DL (ref 8–23)
CALCIUM SERPL-MCNC: 9.2 MG/DL (ref 8.8–10.2)
CHLORIDE BLD-SCNC: 106 MMOL/L (ref 98–111)
CO2: 26 MMOL/L (ref 22–29)
CREAT SERPL-MCNC: 0.9 MG/DL (ref 0.5–0.9)
GFR AFRICAN AMERICAN: >59
GFR NON-AFRICAN AMERICAN: >60
GLUCOSE BLD-MCNC: 108 MG/DL (ref 74–109)
HCT VFR BLD CALC: 42.4 % (ref 37–47)
HEMOGLOBIN: 13.5 G/DL (ref 12–16)
INR BLD: 0.97 (ref 0.88–1.18)
MCH RBC QN AUTO: 30.4 PG (ref 27–31)
MCHC RBC AUTO-ENTMCNC: 31.8 G/DL (ref 33–37)
MCV RBC AUTO: 95.5 FL (ref 81–99)
PDW BLD-RTO: 12.8 % (ref 11.5–14.5)
PLATELET # BLD: 234 K/UL (ref 130–400)
PMV BLD AUTO: 10.2 FL (ref 9.4–12.3)
POTASSIUM SERPL-SCNC: 4.5 MMOL/L (ref 3.5–5)
PROTHROMBIN TIME: 12.8 SEC (ref 12–14.6)
RBC # BLD: 4.44 M/UL (ref 4.2–5.4)
SODIUM BLD-SCNC: 140 MMOL/L (ref 136–145)
WBC # BLD: 5.3 K/UL (ref 4.8–10.8)

## 2021-05-07 PROCEDURE — 36010 PLACE CATHETER IN VEIN: CPT

## 2021-05-07 PROCEDURE — 85027 COMPLETE CBC AUTOMATED: CPT

## 2021-05-07 PROCEDURE — 99152 MOD SED SAME PHYS/QHP 5/>YRS: CPT

## 2021-05-07 PROCEDURE — 75820 VEIN X-RAY ARM/LEG: CPT

## 2021-05-07 PROCEDURE — 2580000003 HC RX 258: Performed by: NURSE PRACTITIONER

## 2021-05-07 PROCEDURE — 37248 TRLUML BALO ANGIOP 1ST VEIN: CPT | Performed by: SURGERY

## 2021-05-07 PROCEDURE — 99153 MOD SED SAME PHYS/QHP EA: CPT

## 2021-05-07 PROCEDURE — 80048 BASIC METABOLIC PNL TOTAL CA: CPT

## 2021-05-07 PROCEDURE — 6360000004 HC RX CONTRAST MEDICATION: Performed by: SURGERY

## 2021-05-07 PROCEDURE — 37252 INTRVASC US NONCORONARY 1ST: CPT | Performed by: SURGERY

## 2021-05-07 PROCEDURE — 85610 PROTHROMBIN TIME: CPT

## 2021-05-07 PROCEDURE — 6360000002 HC RX W HCPCS: Performed by: SURGERY

## 2021-05-07 PROCEDURE — 6370000000 HC RX 637 (ALT 250 FOR IP): Performed by: NURSE PRACTITIONER

## 2021-05-07 PROCEDURE — 37248 TRLUML BALO ANGIOP 1ST VEIN: CPT

## 2021-05-07 PROCEDURE — 2709999900 IR INJ VENOGRAM EXTREMITY BILATERAL

## 2021-05-07 PROCEDURE — 75820 VEIN X-RAY ARM/LEG: CPT | Performed by: SURGERY

## 2021-05-07 PROCEDURE — 37252 INTRVASC US NONCORONARY 1ST: CPT

## 2021-05-07 PROCEDURE — 2500000003 HC RX 250 WO HCPCS: Performed by: SURGERY

## 2021-05-07 PROCEDURE — 36415 COLL VENOUS BLD VENIPUNCTURE: CPT

## 2021-05-07 PROCEDURE — 36010 PLACE CATHETER IN VEIN: CPT | Performed by: SURGERY

## 2021-05-07 RX ORDER — SODIUM CHLORIDE 9 MG/ML
25 INJECTION, SOLUTION INTRAVENOUS PRN
Status: DISCONTINUED | OUTPATIENT
Start: 2021-05-07 | End: 2021-05-09 | Stop reason: HOSPADM

## 2021-05-07 RX ORDER — SODIUM CHLORIDE 9 MG/ML
INJECTION, SOLUTION INTRAVENOUS CONTINUOUS
Status: ACTIVE | OUTPATIENT
Start: 2021-05-07 | End: 2021-05-07

## 2021-05-07 RX ORDER — MIDAZOLAM HYDROCHLORIDE 1 MG/ML
INJECTION INTRAMUSCULAR; INTRAVENOUS
Status: COMPLETED | OUTPATIENT
Start: 2021-05-07 | End: 2021-05-07

## 2021-05-07 RX ORDER — FENTANYL CITRATE 50 UG/ML
INJECTION, SOLUTION INTRAMUSCULAR; INTRAVENOUS
Status: COMPLETED | OUTPATIENT
Start: 2021-05-07 | End: 2021-05-07

## 2021-05-07 RX ORDER — SODIUM CHLORIDE 0.9 % (FLUSH) 0.9 %
5-40 SYRINGE (ML) INJECTION PRN
Status: DISCONTINUED | OUTPATIENT
Start: 2021-05-07 | End: 2021-05-09 | Stop reason: HOSPADM

## 2021-05-07 RX ORDER — ASPIRIN 81 MG/1
81 TABLET ORAL ONCE
Status: COMPLETED | OUTPATIENT
Start: 2021-05-07 | End: 2021-05-07

## 2021-05-07 RX ORDER — CLONIDINE HYDROCHLORIDE 0.1 MG/1
0.1 TABLET ORAL PRN
Status: DISCONTINUED | OUTPATIENT
Start: 2021-05-07 | End: 2021-05-09 | Stop reason: HOSPADM

## 2021-05-07 RX ORDER — LIDOCAINE HYDROCHLORIDE 20 MG/ML
INJECTION, SOLUTION INFILTRATION; PERINEURAL
Status: COMPLETED | OUTPATIENT
Start: 2021-05-07 | End: 2021-05-07

## 2021-05-07 RX ORDER — SODIUM CHLORIDE 0.9 % (FLUSH) 0.9 %
5-40 SYRINGE (ML) INJECTION EVERY 12 HOURS SCHEDULED
Status: DISCONTINUED | OUTPATIENT
Start: 2021-05-07 | End: 2021-05-09 | Stop reason: HOSPADM

## 2021-05-07 RX ORDER — SODIUM CHLORIDE 0.9 % (FLUSH) 0.9 %
10 SYRINGE (ML) INJECTION PRN
Status: DISCONTINUED | OUTPATIENT
Start: 2021-05-07 | End: 2021-05-09 | Stop reason: HOSPADM

## 2021-05-07 RX ORDER — SODIUM CHLORIDE 9 MG/ML
INJECTION, SOLUTION INTRAVENOUS CONTINUOUS
Status: DISCONTINUED | OUTPATIENT
Start: 2021-05-07 | End: 2021-05-09 | Stop reason: HOSPADM

## 2021-05-07 RX ORDER — HEPARIN SODIUM 5000 [USP'U]/ML
INJECTION, SOLUTION INTRAVENOUS; SUBCUTANEOUS
Status: COMPLETED | OUTPATIENT
Start: 2021-05-07 | End: 2021-05-07

## 2021-05-07 RX ORDER — IODIXANOL 320 MG/ML
INJECTION, SOLUTION INTRAVASCULAR
Status: COMPLETED | OUTPATIENT
Start: 2021-05-07 | End: 2021-05-07

## 2021-05-07 RX ORDER — HYDRALAZINE HYDROCHLORIDE 20 MG/ML
INJECTION INTRAMUSCULAR; INTRAVENOUS
Status: COMPLETED | OUTPATIENT
Start: 2021-05-07 | End: 2021-05-07

## 2021-05-07 RX ADMIN — SODIUM CHLORIDE: 9 INJECTION, SOLUTION INTRAVENOUS at 08:06

## 2021-05-07 RX ADMIN — HEPARIN SODIUM 1000 UNITS: 5000 INJECTION, SOLUTION INTRAVENOUS; SUBCUTANEOUS at 10:24

## 2021-05-07 RX ADMIN — IODIXANOL 10 ML: 320 INJECTION, SOLUTION INTRAVASCULAR at 10:35

## 2021-05-07 RX ADMIN — FENTANYL CITRATE 25 MCG: 50 INJECTION, SOLUTION INTRAMUSCULAR; INTRAVENOUS at 09:54

## 2021-05-07 RX ADMIN — HEPARIN SODIUM 5000 UNITS: 5000 INJECTION, SOLUTION INTRAVENOUS; SUBCUTANEOUS at 09:56

## 2021-05-07 RX ADMIN — ASPIRIN 81 MG: 81 TABLET, COATED ORAL at 08:17

## 2021-05-07 RX ADMIN — LIDOCAINE HYDROCHLORIDE 10 ML: 20 INJECTION, SOLUTION INFILTRATION; PERINEURAL at 09:55

## 2021-05-07 RX ADMIN — HYDRALAZINE HYDROCHLORIDE 10 MG: 20 INJECTION INTRAMUSCULAR; INTRAVENOUS at 09:54

## 2021-05-07 RX ADMIN — MIDAZOLAM 0.5 MG: 1 INJECTION INTRAMUSCULAR; INTRAVENOUS at 10:23

## 2021-05-07 RX ADMIN — FENTANYL CITRATE 25 MCG: 50 INJECTION, SOLUTION INTRAMUSCULAR; INTRAVENOUS at 10:08

## 2021-05-07 RX ADMIN — MIDAZOLAM 1 MG: 1 INJECTION INTRAMUSCULAR; INTRAVENOUS at 09:53

## 2021-05-07 RX ADMIN — MIDAZOLAM 0.5 MG: 1 INJECTION INTRAMUSCULAR; INTRAVENOUS at 10:08

## 2021-05-07 NOTE — OP NOTE
Patient Name: Rosi Dos Santos   YOB: 1945   MRN: [id number]     Procedure Date: 5/7/2021     Pre Op Diagnosis: Right leg edema    Post Op Diagnosis: same    Procedure: Right leg venogram.  Right common and external intravascular ultrasound. Balloon angioplasty of right external iliac vein with 12 x 40  balloon. .    Anesthesia: Local with Moderate Sedation    Estimated Blood Loss: Less than 50 ml    Complications: None    Implants: none    Procedure Details: Patient was brought to the angiogram suite and placed supine position. Her bilateral groin and the right upper thigh were prepped and draped in sterile fashion. Right femoral vein was accessed using standard micropuncture technique. Micropuncture sheath was inserted and changed to the 8 [address] access sheath. J-wire was floated into the IVC. Intravascular ultrasound was performed and showed generally narrow right-sided iliac veins with 20 to 30% stenosis at the external iliac vein proximally. I decided to see if balloon is can have any wasting. And perform balloon angioplasty at bedside. I gave 1000 units of heparin. And performed balloon angioplasty using 12 x 40  balloon. There was no wasting and balloon and no really change. Wires were removed and the sheath was pulled with manual pressure until hemostasis. Patient tolerated procedure well. Findings: Patent right common iliac vein, 20 to 30% right external iliac vein stenosis. Patent external iliac and common femoral vein. Patent IVC.     Disposition:aroused from sedation, and taken to the recovery room in a stable condition    Sherry Hinojosa DO  5/7/2021 10:28 AM

## 2021-05-07 NOTE — PROGRESS NOTES
Returned post venogram. Awake and alert. Puncture site to right groin site clear with gauze and tegaderm dressing in place. Denies any c/o pain. Daughter at bedside.

## 2021-05-07 NOTE — INTERVAL H&P NOTE
Update History & Physical    The patient's History and Physical of April 27, 2021 was reviewed with the patient and I examined the patient. There was no change. The surgical site was confirmed by the patient and me. Plan: The risks, benefits, expected outcome, and alternative to the recommended procedure have been discussed with the patient. Patient understands and wants to proceed with the procedure.    ASA III, Mallenpati 3    Electronically signed by Israel Miranda DO on 5/7/2021 at 9:44 AM

## 2021-05-07 NOTE — PROGRESS NOTES
Patient and daughter instructed on care of right groin site post venogram.  Given written instructions. Both stated understanding.